# Patient Record
Sex: FEMALE | Race: WHITE
[De-identification: names, ages, dates, MRNs, and addresses within clinical notes are randomized per-mention and may not be internally consistent; named-entity substitution may affect disease eponyms.]

---

## 2018-05-20 ENCOUNTER — HOSPITAL ENCOUNTER (EMERGENCY)
Dept: HOSPITAL 45 - C.EDB | Age: 38
Discharge: HOME | End: 2018-05-20
Payer: COMMERCIAL

## 2018-05-20 VITALS — HEART RATE: 75 BPM | OXYGEN SATURATION: 98 % | SYSTOLIC BLOOD PRESSURE: 136 MMHG | DIASTOLIC BLOOD PRESSURE: 90 MMHG

## 2018-05-20 VITALS
WEIGHT: 251.33 LBS | BODY MASS INDEX: 45.67 KG/M2 | HEIGHT: 62.01 IN | WEIGHT: 251.33 LBS | HEIGHT: 62.01 IN | BODY MASS INDEX: 45.67 KG/M2

## 2018-05-20 VITALS — TEMPERATURE: 98.06 F

## 2018-05-20 DIAGNOSIS — M25.512: Primary | ICD-10-CM

## 2018-05-20 NOTE — EMERGENCY ROOM VISIT NOTE
History


First contact with patient:  08:20


Chief Complaint:  SHOULDER PAIN


Stated Complaint:  LEFT SHOULDER PAIN AND SWELLING





History of Present Illness


The patient is a 37 year old white [employer]ae[place] female who presents to the Emergency 

Room with complaints of left shoulder pain that has been ongoing for almost a 

week.  Patient denies any specific trauma.  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  She states the child weighs about 30 pounds.  She does 

carry the child a lot.  She notes left shoulder pain that has been getting 

worse.  She was seen by her chiropractor and was told that her shoulder was "

out of place."  She states it was adjusted and "put back in."  The pain 

recurred.  She was seen again and had shoulder massage.  She states it helped 

for a few hours but the pain returned.  She was seen at [facility] last 

night and was told she needed an MRI.  They were unable to perform it last 

night.  She reports here to obtain an MRI.  Pain radiates from the elbow to the 

neck.  Worst pain is at the shoulder.  She notes pain both anterior and 

posterior.  She has lost motion of the shoulder and elbow secondary to the 

pain.  Denies any numbness or tingling.  Right-hand dominant.  Her  

accompanies her today.  She has tried Tylenol and ibuprofen without 

improvement.  She was prescribed Ultram last night but states it did not help 

the pain.  She did get an injection of an unknown medication last night that 

did help for a few hours.  No prior history of shoulder injury.  She does have 

a sling but states it does not help.





Review of Systems


REVIEW OF SYSTEM:


HEENT:  No dizziness, visual problems, hearing loss, or tinnitus.  There is no 

difficulty swallowing and no oral lesions are present.


LYMPH:  No adenopathy.


PULMONARY: No cough, shortness of breath, sputum production or hemoptysis.


CARDIOVASCULAR:  No chest pain, palpitations, shortness of breath or peripheral 

edema.


GASTROINTESTINAL:  No diarrhea, constipation, nausea, vomiting, or abdominal 

pain.


GENITOURINARY:  No dysuria, frequency, urgency or nocturia.


NEUROLOGIC:  No weakness, muscle tenderness, epilepsy or history of 

neurological problems.


MUSCULOSKELETAL: No history of joint tenderness/swelling.  No history of 

arthritis or arthralgias.


SKIN:  No rashes or lesions.


PSYCHIATRIC: No history of depression or mental illness.


ENDOCRINE:  No history of diabetes, thyroid disorders, or abnormal hair growth.





Past Medical/Surgical History


Previous surgeries: None.


Medical history: Unremarkable.





Family History


Her child has cerebral palsy.





Social History


Smoking Status:  Never Smoker


Smokeless Tobacco Use:  No


Alcohol Use:  none


Drug Use:  none


Marital Status:  


Housing Status:  lives with family


Occupation Status:  employed





Current/Historical Medications


Scheduled


Prednisone (Prednisone), 0 PO DAILY





Scheduled PRN


Hydrocodone/Acetaminophen 5MG/325MG (Norco 5MG/325MG), 1 TABLET PO Q6 PRN for 

Pain


Tramadol (Ultram), 50 MG PO Q8H PRN for Pain





Allergies


Coded Allergies:  


     No Known Allergies (Unverified , 5/20/18)





Physical Exam


Vital Signs











  Date Time  Temp Pulse Resp B/P (MAP) Pulse Ox O2 Delivery O2 Flow Rate FiO2


 


5/20/18 09:14  75 18 136/90 98 Room Air  


 


5/20/18 08:13 36.7 74 16 160/96 97 Room Air  











Physical Exam


General: Well-developed, well-nourished, young white female, who looks older 

than her stated age.  Sitting on a chair.  Obvious discomfort.  No acute 

distress.  Alert and oriented.  


Skin: Warm and dry with good turgor.  No rashes or lesions.  No ecchymosis or 

erythema.  The patient is not  diaphoretic.  No abrasions.


Musculoskeletal: Left shoulder evaluation reveals no obvious asymmetry or 

deformity.  There is no fullness in the anterior chest to suggest dislocation.  

She has focal discomfort with palpation over the proximal biceps tendon, 

subacromial space, and posterior rotator cuff musculature.  No pain with 

palpation over the wrist, forearm, elbow, bicep muscle belly, or tricep muscle 

belly.  Mild discomfort with palpation over her trapezius.  No pain over the 

cervical spine.  Intact motor function to the fingers and wrist.  She does have 

full elbow extension as well as flexion to about 110.  Passively I can get her 

to around 120.  Very limited shoulder motion secondary to pain.  Pendulum 

exercises do not improve her motion much.  Internal rotation with her arm 

across her abdomen.  External rotation just to neutral.  Very limited forward 

flexion and extension secondary to shoulder pain.  Good range of motion of her 

neck.


Neurologic: Gross sensation is intact across all aspects of the left arm by 

soft touch.  Peripheral pulses are 2+.  Sensory and motor function of the radial

, median, and ulnar nerves are clearly intact.  Peripheral pulses are 2+.





Medical Decision & Procedures


Medications Administered











 Medications


  (Trade)  Dose


 Ordered  Sig/Romario


 Route  Start Time


 Stop Time Status Last Admin


Dose Admin


 


 Prednisone


  (PredniSONE TAB)  60 mg  NOW  STAT


 PO  5/20/18 08:54


 5/20/18 08:56 DC 5/20/18 09:12


60 MG


 


 Acetaminophen/


 Hydrocodone Bitart


  (Norco 5/325 Tab)  1 tab  ONE  ONCE


 PO  5/20/18 09:00


 5/20/18 09:01 DC 5/20/18 09:13


1 TAB





Prednisone 60 mg p.o., Norco 5 mg p.o.





ED Course


Patient and her  were educated regarding today's findings.  Conservative 

care measures were discussed.  She already had x-rays at [facility] last night.  

Unfortunately she did not bring them along with her.  They are reportedly 

normal.  I did spend considerable time with him explaining shoulder anatomy and 

what was causing her pain.  Possibility of rotator cuff injury and biceps 

tendon injury were discussed at length.  They are here for an MRI.  They will 

be paying out of pocket.  Because of the request to keep costs down, I did 

provide him with an outpatient prescription for MRI arthrogram of her shoulder.

  She will likely need physical therapy.  She may require surgical intervention 

if a tear of the biceps or rotator cuff is noted.  I did request that she avoid 

seeing the chiropractor for her shoulder until imaging is completed.  The OTC 

medications and tramadol are not working.  Prescription was provided for a 

small amount of Norco 5 mg to be used every 6 hours as needed for severe pain.  

Sedation, fall risk, and constipation risks were discussed.  She was also given 

a dose of 60 mg of prednisone.  Additional prescription for 60 mg 1 day, 40 mg 

2 days, and 20 mg 2 days was provided.  Continue the pendulum exercises for 

range of motion.  Follow-up with her PCP.  She was shown how to use her sling 

and should use this for support.  Continue to ice the shoulder frequently.  

Sleep in an upright position if that is more comfortable.  Avoid any heavy 

lifting, including her child.





Medical Decision


Possibility of cervical radiculopathy, rotator cuff strain, rotator cuff tear, 

biceps tendon tear, labral tear, shoulder impingement, shoulder dislocation, 

fracture, and pectoral strain were considered among others.





[place] Drug Monitoring Program


Search Results:  no issues identified





Impression





 Primary Impression:  


 Left shoulder pain





Departure Information


Dispostion


Home / Self-Care





Condition


GOOD





Prescriptions





Hydrocodone/Acetaminophen 5MG/325MG (Norco 5MG/325MG)  Tab


1 TABLET PO Q6 Y for Pain, #12 TAB


   For Initial Treatment


   Prov: Chang Rodriguez,P.A.         5/20/18 


Prednisone (Prednisone) 20 Mg Tab


0 PO DAILY, #9 TAB


   3 DAILY FOR 1 DAYS, THEN 2 DAILY FOR 2 DAYS, THEN 1 DAILY FOR 2 DAYS.


   Prov: Chang Rodriguez,P.A.         5/20/18





Forms


HOME CARE DOCUMENTATION FORM,                                                 

               SPECIAL NARCOTICS INSTRUCTIONS, IMPORTANT VISIT INFORMATION





Patient Instructions


My [facility]





Additional Instructions





Prednisone daily-take 3 tablets on Monday, 2 tablets on Tuesday and Wednesday, 

and 1 tablet on Thursday and Friday


Norco 1 tablet every 6 hours as needed for severe pain-no driving


Use the sling as needed


Apply ice to the shoulder frequently to improve pain


Start the pendulum exercises shown to you in the ER


Follow-up with your PCP


Call [phone] to arrange imaging





Problem Qualifiers








 Primary Impression:  


 Left shoulder pain


 Chronicity:  acute  Qualified Codes:  M25.512 - Pain in left shoulder